# Patient Record
Sex: MALE | Race: BLACK OR AFRICAN AMERICAN | NOT HISPANIC OR LATINO
[De-identification: names, ages, dates, MRNs, and addresses within clinical notes are randomized per-mention and may not be internally consistent; named-entity substitution may affect disease eponyms.]

---

## 2021-02-03 ENCOUNTER — APPOINTMENT (OUTPATIENT)
Dept: RADIOLOGY | Facility: CLINIC | Age: 56
End: 2021-02-03

## 2021-02-04 PROBLEM — Z00.00 ENCOUNTER FOR PREVENTIVE HEALTH EXAMINATION: Status: ACTIVE | Noted: 2021-02-04

## 2021-02-10 ENCOUNTER — APPOINTMENT (OUTPATIENT)
Dept: PULMONOLOGY | Facility: CLINIC | Age: 56
End: 2021-02-10
Payer: COMMERCIAL

## 2021-02-10 VITALS — HEIGHT: 73 IN | WEIGHT: 245 LBS | BODY MASS INDEX: 32.47 KG/M2

## 2021-02-10 PROCEDURE — G0296 VISIT TO DETERM LDCT ELIG: CPT

## 2021-02-10 PROCEDURE — 99072 ADDL SUPL MATRL&STAF TM PHE: CPT

## 2021-02-10 NOTE — REASON FOR VISIT
[Home] : at home, [unfilled] , at the time of the visit. [Medical Office: ([facility])___] : at the medical office located in  [Verbal consent obtained from patient] : the patient, [unfilled] [Initial Evaluation] : an initial evaluation visit [Review of Eligibility] : review of eligibility [Low-Dose CT Screening Discussion] : low-dose CT lung cancer screening discussion [FreeTextEntry4] : wife

## 2021-02-10 NOTE — HISTORY OF PRESENT ILLNESS
[Current] : current smoker [>= 30 pack years] : >= 30 pack years [TextBox_13] : Referred by Dr. Chucky Darby\par \par Mr. ANAMARIA GUNTER  is a 56 year old man with a history of nicotine dependence\par \par He  was seen in the office by Dr. Darby  for review of eligibility for, as well as, discussion of Low-Dose CT lung cancer screening program. Over the telephone today we reviewed and confirmed that the patient meets screening eligibility criteria:\par -Age: 56 year \par Smoking status:\par -Current smoker\par -Number of pack(s) per day: up to 1\par -Number of years smoked: 35\par -Number of pack years smokin\par \par Mr. GUNTER denies any signs or symptoms of lung cancer including new cough, change in cough, hemoptysis and unintentional weight loss. \par \par Mr. GUNTER denies any personal history of lung cancer. No lung cancer in a 1st degree relative. Denies any history of lung disease. Denies any history of occupational exposures. \par \par

## 2021-02-10 NOTE — PLAN
[Smoking Cessation Guidance Provided] : Smoking cessation guidance was provided to patient [[facility]] : referred to [facility] [phone] [Smoking Cessation] : smoking cessation [FreeTextEntry1] : Plan:\par -Low Dose CT chest for lung cancer screening\par -Follow up with patient and his referring provider after his LDCT results have been reviewed by the multi-disciplinary clinical team\par -Encouraged smoking cessation\par -Referral to CTC\par \par \par Engaged in shared decision making with Mr. ANAMARIA GUNTER . Answered all questions. He verbalized understanding and agreement. He knows to call back with any questions or concerns

## 2021-02-16 ENCOUNTER — OUTPATIENT (OUTPATIENT)
Dept: OUTPATIENT SERVICES | Facility: HOSPITAL | Age: 56
LOS: 1 days | End: 2021-02-16

## 2021-02-16 ENCOUNTER — APPOINTMENT (OUTPATIENT)
Dept: CT IMAGING | Facility: CLINIC | Age: 56
End: 2021-02-16
Payer: COMMERCIAL

## 2021-02-16 PROCEDURE — 71271 CT THORAX LUNG CANCER SCR C-: CPT | Mod: 26

## 2021-02-17 ENCOUNTER — TRANSCRIPTION ENCOUNTER (OUTPATIENT)
Age: 56
End: 2021-02-17

## 2021-03-03 DIAGNOSIS — J43.2 CENTRILOBULAR EMPHYSEMA: ICD-10-CM

## 2021-03-03 DIAGNOSIS — J47.9 BRONCHIECTASIS, UNCOMPLICATED: ICD-10-CM

## 2021-03-03 DIAGNOSIS — F17.210 NICOTINE DEPENDENCE, CIGARETTES, UNCOMPLICATED: ICD-10-CM

## 2021-03-03 DIAGNOSIS — R91.8 OTHER NONSPECIFIC ABNORMAL FINDING OF LUNG FIELD: ICD-10-CM

## 2021-12-10 ENCOUNTER — NON-APPOINTMENT (OUTPATIENT)
Age: 56
End: 2021-12-10

## 2021-12-13 ENCOUNTER — APPOINTMENT (OUTPATIENT)
Dept: PHYSICAL MEDICINE AND REHAB | Facility: CLINIC | Age: 56
End: 2021-12-13
Payer: COMMERCIAL

## 2021-12-13 VITALS — WEIGHT: 245 LBS | HEIGHT: 73 IN | BODY MASS INDEX: 32.47 KG/M2

## 2021-12-13 DIAGNOSIS — M76.01 GLUTEAL TENDINITIS, RIGHT HIP: ICD-10-CM

## 2021-12-13 DIAGNOSIS — Z78.9 OTHER SPECIFIED HEALTH STATUS: ICD-10-CM

## 2021-12-13 PROCEDURE — 99202 OFFICE O/P NEW SF 15 MIN: CPT

## 2021-12-13 RX ORDER — IBUPROFEN 200 MG/1
TABLET, COATED ORAL
Refills: 0 | Status: ACTIVE | COMMUNITY

## 2021-12-13 RX ORDER — NAPROXEN SODIUM 220 MG
TABLET ORAL
Refills: 0 | Status: ACTIVE | COMMUNITY

## 2021-12-13 NOTE — ASSESSMENT
[FreeTextEntry1] : Impression:\par 1. Right GTPS\par \par Plan: The history and physical examination were reviewed. The diagnosis was discussed in detail with the patient. We discussed all the potential treatment options including physical therapy, oral medication, ultrasound guided injections, and surgery; as well as alternative therapeutics such as acupuncture and massage. We also discussed the importance of activity modification, ergonomics, and biomechanics as they pertain to the management of the condition as well as overall health and well being. I offered the patient a course of formal physical therapy as well as a HEP, however he declined stating that he could do it all on his own. We briefly reviewed the biomechanics of the exercise program. The patient will return to the office as needed. The patient expressed verbal understanding and is in agreement with the plan of care. All of the patient's questions and concerns were addressed during today's visit.

## 2021-12-13 NOTE — PHYSICAL EXAM
[FreeTextEntry1] : LUMBAR\par GEN: AAOx3. NAD.\par INSP: Spine alignment midline. No evidence of scoliosis.\par STANCE: Trendelenburg/SLS (-) R (-) L. \par GAIT: (-) Antalgic. Normal reciprocating heel to toe\par SENS: Grossly intact to LT BLE.\par REFLEXES: Symmetric patella, achilles. \par TONE: Normal. No clonus.\par SPECIAL: SLR/Slump (-) R (-) L.   Gaenslen (-) R (-) L.\par                 FADIR (-) R (-) L.          JUAN C (-) R (-) L.  \par PALP: Midline/Spinous processes (-).   Paraspinals (-) R  (-) L.   \par            QL (-) R (-) L.      SIJ (-) R (-) L.            GTB (-) R (-) L.\par \par LS ROM:                                                              \par Flex               Full.    Axial Sx (-).    LE Sx R (-) L (-).\par Ext                 Full.    Axial Sx (-).    LE Sx R (-) L (-).\par Lat Flex         Full.    Axial Sx (-).    LE Sx R (-) L (-).       \par Rotation         Full.    Axial Sx (-).    LE Sx R (-) L (-). \par Oblique Ext    Full.    Axial Sx (-).    LE Sx R (-) L (-).  \par \par HIP ROM:      RIGHT           LEFT\par Flex             Full. (-)          Full. (-)\par IR                 Full. (-)          Full. (-)\par ER                Full. (-)          Full. (-)\par \par STRENGTH:    RIGHT      LEFT\par Hip Flex            5/5 5/5\par Hip ABd            5/5 5/5\par Knee Ext           5/5 5/5\par Ankle DF           5/5 5/5\par Ankle PF           5/5 5/5\par EHL                   5/5 5/5\par EV                    5/5 5/5

## 2021-12-13 NOTE — HISTORY OF PRESENT ILLNESS
[FreeTextEntry1] : Referring Physician: Dr. Chucky Darby MD\par \par Mr. ANAMARIA GUNTER is a very pleasant 56 year male who comes in for evaluation of Right Hip Pain that has been ongoing for 1.5 weeks without any specific injury or inciting event, however he does report that his Right knee swelled up the day that the hip pain started. Since then the pain has spontaneously resolved. Patient used Advil, Ice and Heat which helped provide relief. The pain was located focal to the right lateral hip intermittent and described as achy. The pain is rated as 0/10 and ranged from 2-9/10 during the episode. The patient's symptoms were aggravated by walking and alleviated by heat, ice and Advil. The patient works as a . The patient denies any night pain, numbness/tingling, weakness, or bowel/bladder dysfunction. The patient has no other complaints at this time.\par

## 2022-04-29 ENCOUNTER — NON-APPOINTMENT (OUTPATIENT)
Age: 57
End: 2022-04-29

## 2022-05-03 ENCOUNTER — NON-APPOINTMENT (OUTPATIENT)
Age: 57
End: 2022-05-03

## 2022-05-03 ENCOUNTER — APPOINTMENT (OUTPATIENT)
Dept: PULMONOLOGY | Facility: CLINIC | Age: 57
End: 2022-05-03
Payer: COMMERCIAL

## 2022-05-03 VITALS — WEIGHT: 245 LBS | BODY MASS INDEX: 32.47 KG/M2 | HEIGHT: 73 IN

## 2022-05-03 DIAGNOSIS — F17.210 NICOTINE DEPENDENCE, CIGARETTES, UNCOMPLICATED: ICD-10-CM

## 2022-05-03 PROCEDURE — G0296 VISIT TO DETERM LDCT ELIG: CPT

## 2022-05-04 PROBLEM — F17.210 CIGARETTE SMOKER: Status: ACTIVE | Noted: 2021-02-10

## 2022-05-04 NOTE — HISTORY OF PRESENT ILLNESS
[TextBox_13] : Referred by Dr. Chucky Darby\par \par Mr. ANAMARIA GUNTER  is a 57 year old man with a history of nicotine dependence\par \par He  was seen in the office by Dr. Darby  for review of eligibility for, as well as, discussion of Low-Dose CT lung cancer screening program. Over the telephone today we reviewed and confirmed that the patient meets screening eligibility criteria:\par -Age: 57 year \par Smoking status:\par -Current smoker\par -Number of pack(s) per day: up to 1\par -Number of years smoked: 36\par -Number of pack years smokin\par \par He is currently smoking about 1/2 pack per day.\par \par Mr. GUNTER denies any signs or symptoms of lung cancer including new cough, change in cough, hemoptysis and unintentional weight loss. \par \par Mr. GUNTER denies any personal history of lung cancer. No lung cancer in a 1st degree relative. Denies any history of lung disease. Denies any history of occupational exposures. \par \par

## 2022-05-04 NOTE — PLAN
[FreeTextEntry1] : Plan:\par -Low Dose CT chest for lung cancer screening\par -Follow up with patient and his referring provider after his LDCT results have been reviewed by the multi-disciplinary clinical team\par -Encouraged smoking cessation\par -Declined referral to CTC\par \par \par Engaged in shared decision making with Mr. ANAMARIA GUNTER . Answered all questions. He verbalized understanding and agreement. He knows to call back with any questions or concerns

## 2022-05-16 ENCOUNTER — APPOINTMENT (OUTPATIENT)
Dept: CT IMAGING | Facility: CLINIC | Age: 57
End: 2022-05-16
Payer: COMMERCIAL

## 2022-05-16 ENCOUNTER — OUTPATIENT (OUTPATIENT)
Dept: OUTPATIENT SERVICES | Facility: HOSPITAL | Age: 57
LOS: 1 days | End: 2022-05-16

## 2022-05-16 ENCOUNTER — RESULT REVIEW (OUTPATIENT)
Age: 57
End: 2022-05-16

## 2022-05-16 PROCEDURE — 71271 CT THORAX LUNG CANCER SCR C-: CPT | Mod: 26

## 2022-05-18 ENCOUNTER — NON-APPOINTMENT (OUTPATIENT)
Age: 57
End: 2022-05-18

## 2022-08-09 ENCOUNTER — EMERGENCY (EMERGENCY)
Facility: HOSPITAL | Age: 57
LOS: 1 days | Discharge: HOME | End: 2022-08-09
Attending: EMERGENCY MEDICINE | Admitting: EMERGENCY MEDICINE

## 2022-08-09 VITALS — HEART RATE: 88 BPM

## 2022-08-09 VITALS
OXYGEN SATURATION: 96 % | SYSTOLIC BLOOD PRESSURE: 126 MMHG | HEART RATE: 111 BPM | DIASTOLIC BLOOD PRESSURE: 81 MMHG | RESPIRATION RATE: 18 BRPM | WEIGHT: 220.02 LBS | TEMPERATURE: 99 F

## 2022-08-09 DIAGNOSIS — B02.9 ZOSTER WITHOUT COMPLICATIONS: ICD-10-CM

## 2022-08-09 DIAGNOSIS — R00.0 TACHYCARDIA, UNSPECIFIED: ICD-10-CM

## 2022-08-09 DIAGNOSIS — R21 RASH AND OTHER NONSPECIFIC SKIN ERUPTION: ICD-10-CM

## 2022-08-09 PROCEDURE — 99283 EMERGENCY DEPT VISIT LOW MDM: CPT

## 2022-08-09 RX ORDER — VALACYCLOVIR 500 MG/1
1 TABLET, FILM COATED ORAL
Qty: 21 | Refills: 0
Start: 2022-08-09 | End: 2022-08-15

## 2022-08-09 NOTE — ED PROVIDER NOTE - NSFOLLOWUPINSTRUCTIONS_ED_ALL_ED_FT
Please take the anti-virals as prescribed and you can take over-the-counter Tylenol or Motrin as needed and as prescribed for pain.   Return to the ER if you develop any concerning symptoms.       Shingles    A rash on the skin.   Shingles is an infection. It gives you a painful skin rash and blisters that have fluid in them. Shingles is caused by the same germ (virus) that causes chickenpox.    Shingles only happens in people who:  •Have had chickenpox.      •Have been given a shot (vaccine) to protect against chickenpox. Shingles is rare in this group.        What are the causes?    This condition is caused by varicella-zoster virus. This is the same germ that causes chickenpox. After a person is exposed to the germ, the germ stays in the body but is not active (dormant).    Shingles develops if the germ becomes active again (is reactivated). This can happen many years after the first exposure to the germ. It is not known what causes this germ to become active again.      What increases the risk?    People who have had chickenpox or received the chickenpox shot are at risk for shingles. This infection is more common in people who:  •Are older than 60 years of age.    •Have a weakened disease-fighting system (immune system), such as people with:  •HIV (human immunodeficiency virus).      •AIDS (acquired immunodeficiency syndrome).      •Cancer.        •Are taking medicines that weaken the immune system, such as organ transplant medicines.      •Have a lot of stress.        What are the signs or symptoms?    The first symptoms of shingles may be itching, tingling, or pain in an area on your skin.    A rash will show on your skin a few days or weeks later. This is what usually happens:  •The rash is likely to be on one side of your body.       •The rash usually has a shape like a belt or a band. Over time, the rash turns into fluid-filled blisters.      •The blisters will break open and change into scabs.      •The scabs usually dry up in about 2–3 weeks.      You may also have:  •A fever.      •Chills.      •A headache.      •A feeling like you may vomit (nausea).        How is this treated?    The rash may last for several weeks. There is not a specific cure for this condition.    Your doctor may prescribe medicines. Medicines may:  •Help with pain.      •Help you get better sooner.      •Help to prevent long-term problems.      •Help with itching (antihistamines).      If the area involved is on your face, you may need to see a specialist. This may be an eye doctor or an ear, nose, and throat (ENT) doctor.      Follow these instructions at home:    Medicines     •Take over-the-counter and prescription medicines only as told by your doctor.      •Put on an anti-itch cream or numbing cream where you have a rash, blisters, or scabs. Do this as told by your doctor.        Helping with itching and discomfort   A bathtub filled with water.    •Put cold, wet cloths (cold compresses) on the area of the rash or blisters as told by your doctor.      •Cool baths can help you feel better. Try adding baking soda or dry oatmeal to the water to lessen itching. Do not bathe in hot water.      •Use calamine lotion as told by your doctor.      Blister and rash care     •Keep your rash covered with a loose bandage (dressing).       •Wear loose clothing that does not rub on your rash.      •Wash your hands with soap and water for at least 20 seconds before and after you change your bandage. If you cannot use soap and water, use hand .      •Change your bandage as told by your doctor.      •Keep your rash and blisters clean. To do this, wash the area with mild soap and cool water as told by your doctor.    •Check your rash every day for signs of infection. Check for:  •More redness, swelling, or pain.      •Fluid or blood.      •Warmth.      •Pus or a bad smell.      • Do not scratch your rash. Do not pick at your blisters. To help you to not scratch:  •Keep your fingernails clean and cut short.      •Wear gloves or mittens when you sleep, if scratching is a problem.        General instructions     •Rest as told by your doctor.      •Wash your hands often with soap and water for at least 20 seconds. If you cannot use soap and water, use hand . Doing this lowers your chance of getting a skin infection.    •Your infection can cause chickenpox in people who have never had chickenpox or never got a chickenpox vaccine shot. If you have blisters that did not change into scabs yet, try not to touch other people or be around other people, especially:  •Babies.      •Pregnant women.      •Children who have areas of red, itchy, or rough skin (eczema).      •Older people who have organ transplants.      •People who have a long-term (chronic) illness, like cancer or AIDS.        •Keep all follow-up visits.        How is this prevented?    A vaccine shot is the best way to prevent shingles and protect against shingles problems.    If you have not had a vaccine shot, talk with your doctor about getting it.      Where to find more information    •Centers for Disease Control and Prevention: www.cdc.gov        Contact a doctor if:    •Your pain does not get better with medicine.      •Your pain does not get better after the rash heals.    •You have any of these signs of infection around the rash:  •More redness, swelling, or pain.      •Fluid or blood.      •Warmth.      •Pus or a bad smell.        •You have a fever.        Get help right away if:    •The rash is on your face or nose.      •You have pain in your face or pain by your eye.      •You lose feeling on one side of your face.      •You have trouble seeing.      •You have ear pain, or you have ringing in your ear.      •You have a loss of taste.      •Your condition gets worse.        Summary    •Shingles gives you a painful skin rash and blisters that have fluid in them.      •Shingles is caused by the same germ (virus) that causes chickenpox.      •Keep your rash covered with a loose bandage. Wear loose clothing that does not rub on your rash.      •If you have blisters that did not change into scabs yet, try not to touch other people or be around people.      This information is not intended to replace advice given to you by your health care provider. Make sure you discuss any questions you have with your health care provider.

## 2022-08-09 NOTE — ED PROVIDER NOTE - CLINICAL SUMMARY MEDICAL DECISION MAKING FREE TEXT BOX
56 y/o M presenting today with a rash developing over his groin area and down his right thigh. ED course vital signs noted. Pt is tachycardic and afebrile. Rest of the vital sign are within normal limits. Reassured pt and advised about the rash which is secondary to shingles. Pt given Valacyclovir prescription, advised to isolate, and given return precautions. 56 yo M p/w vesicular rash to right lower back and right groin area radiating to right thigh X 3 days c/w shingles. No fevers/ chills.   ED course: Vital signs noted. Pt is tachycardic and afebrile. Rest of the vital sign are within normal limits. VS normalized without any intervention. Rash appears to be shingles in the L1 distribution. Pt reassured and Rx given for Valacyclovir prescription, advised to isolate. Advised OTC pain meds prn pain.  Non-toxic appearing and stable for discharge. To follow up outpatient. Strict return precautions given.

## 2022-08-09 NOTE — ED PROVIDER NOTE - OBJECTIVE STATEMENT
56 y/o M otherwise healthy and not on chronic daily medications works as a  presenting today with a rash developing over his groin area and down his right thigh. The pt first noticed the rash 3 days ago and says that there has been new and worsening rashes over the past 24 hours. Pt also endorses chronic lower back pain and left upper thigh/buttock pain, which pt believes is secondary to working out. Pt denies pain, fever, chills. No known monkey pox contacts. Currently present with his wife at the bed side. Pt cannot recall if he had chicken pox as a child. 56 y/o M, otherwise healthy and not on chronic daily medications, works as a , presenting today with his wife at his bedside, with a rash developing over his right groin area and down his right thigh X 3 days. The pt first noticed the rash 3 days ago and says that there has been new and worsening rashes over the past 24 hours. Pt also endorses chronic lower back pain and left upper thigh/buttock,  pain, which pt believes is secondary to working out. Pt denies pain, fever, chills. No known monkey pox contacts. Pt cannot recall if he had chicken pox as a child.

## 2022-08-09 NOTE — ED PROVIDER NOTE - PATIENT PORTAL LINK FT
You can access the FollowMyHealth Patient Portal offered by [facility] by registering at the following website: [email or web address]. By joining [facility]’s FollowMyHealth portal, you will also be able to view your health information using other applications (apps) compatible with our system.

## 2022-08-09 NOTE — ED ADULT NURSE NOTE - ASSOCIATED SYMPTOMS
Patient c/o pain to left leg and rash to right thing denies any other complaints [id number] ambulatory appears to be in no acute distress at time of assessment.

## 2022-08-09 NOTE — ED PROVIDER NOTE - SKIN, MLM
Vesicular rash noted to right sided groin area radiating down the right thigh. Diffuse vesicular rash to right lower back area. Diffuse vesicular rash noted to right sided groin area radiating down the right thigh and right lower back.

## 2022-08-09 NOTE — ED ADULT NURSE NOTE - NSIMPLEMENTINTERV_GEN_ALL_ED
Implemented All Universal Safety Interventions:  [place] to call system. Call bell, personal items and telephone within reach. Instruct patient to call for assistance. Room bathroom lighting operational. Non-slip footwear when patient is off stretcher. Physically safe environment: no spills, clutter or unnecessary equipment. Stretcher in lowest position, wheels locked, appropriate side rails in place.

## 2023-08-28 ENCOUNTER — APPOINTMENT (OUTPATIENT)
Dept: CT IMAGING | Facility: CLINIC | Age: 58
End: 2023-08-28

## 2024-07-02 ENCOUNTER — NON-APPOINTMENT (OUTPATIENT)
Age: 59
End: 2024-07-02

## 2024-07-03 VITALS — HEIGHT: 74 IN | WEIGHT: 240 LBS | BODY MASS INDEX: 30.8 KG/M2

## 2024-07-03 DIAGNOSIS — F17.210 NICOTINE DEPENDENCE, CIGARETTES, UNCOMPLICATED: ICD-10-CM

## 2024-07-14 ENCOUNTER — NON-APPOINTMENT (OUTPATIENT)
Age: 59
End: 2024-07-14

## 2024-07-15 ENCOUNTER — EMERGENCY (EMERGENCY)
Facility: HOSPITAL | Age: 59
LOS: 1 days | Discharge: HOME | End: 2024-07-15
Admitting: EMERGENCY MEDICINE
Payer: COMMERCIAL

## 2024-07-15 VITALS
WEIGHT: 240.08 LBS | HEART RATE: 110 BPM | SYSTOLIC BLOOD PRESSURE: 116 MMHG | RESPIRATION RATE: 16 BRPM | OXYGEN SATURATION: 95 % | TEMPERATURE: 98 F | HEIGHT: 73 IN | DIASTOLIC BLOOD PRESSURE: 83 MMHG

## 2024-07-15 VITALS
TEMPERATURE: 98 F | SYSTOLIC BLOOD PRESSURE: 142 MMHG | DIASTOLIC BLOOD PRESSURE: 82 MMHG | HEART RATE: 98 BPM | OXYGEN SATURATION: 95 % | RESPIRATION RATE: 16 BRPM

## 2024-07-15 DIAGNOSIS — S50.311A ABRASION OF RIGHT ELBOW, INITIAL ENCOUNTER: ICD-10-CM

## 2024-07-15 PROCEDURE — 71250 CT THORAX DX C-: CPT | Mod: 26,MC

## 2024-07-15 PROCEDURE — 73030 X-RAY EXAM OF SHOULDER: CPT | Mod: 26,RT

## 2024-07-15 PROCEDURE — 99285 EMERGENCY DEPT VISIT HI MDM: CPT

## 2024-07-15 PROCEDURE — 73564 X-RAY EXAM KNEE 4 OR MORE: CPT | Mod: 26,RT

## 2024-07-15 PROCEDURE — 73000 X-RAY EXAM OF COLLAR BONE: CPT | Mod: 26,RT

## 2024-07-15 RX ORDER — LIDOCAINE HCL 28 MG/G
1 GEL TOPICAL ONCE
Refills: 0 | Status: COMPLETED | OUTPATIENT
Start: 2024-07-15 | End: 2024-07-15

## 2024-07-15 RX ORDER — ACETAMINOPHEN 325 MG
2 TABLET ORAL
Qty: 30 | Refills: 0
Start: 2024-07-15 | End: 2024-07-19

## 2024-07-15 RX ORDER — METHOCARBAMOL 500 MG
1500 TABLET ORAL ONCE
Refills: 0 | Status: COMPLETED | OUTPATIENT
Start: 2024-07-15 | End: 2024-07-15

## 2024-07-15 RX ORDER — ACETAMINOPHEN 325 MG
975 TABLET ORAL ONCE
Refills: 0 | Status: COMPLETED | OUTPATIENT
Start: 2024-07-15 | End: 2024-07-15

## 2024-07-15 RX ORDER — LIDOCAINE HCL 28 MG/G
1 GEL TOPICAL
Qty: 1 | Refills: 0
Start: 2024-07-15 | End: 2024-07-19

## 2024-07-15 RX ORDER — OXYCODONE HYDROCHLORIDE 100 MG/5ML
1 SOLUTION ORAL
Qty: 16 | Refills: 0
Start: 2024-07-15 | End: 2024-07-18

## 2024-07-15 RX ADMIN — LIDOCAINE HCL 1 PATCH: 28 GEL TOPICAL at 14:10
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
## 2024-07-18 DIAGNOSIS — M25.511 PAIN IN RIGHT SHOULDER: ICD-10-CM

## 2024-07-18 DIAGNOSIS — R00.0 TACHYCARDIA, UNSPECIFIED: ICD-10-CM

## 2024-07-18 DIAGNOSIS — M25.561 PAIN IN RIGHT KNEE: ICD-10-CM

## 2024-07-18 DIAGNOSIS — S22.41XA MULTIPLE FRACTURES OF RIBS, RIGHT SIDE, INITIAL ENCOUNTER FOR CLOSED FRACTURE: ICD-10-CM
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
## 2024-07-18 DIAGNOSIS — S42.032A DISPLACED FRACTURE OF LATERAL END OF LEFT CLAVICLE, INITIAL ENCOUNTER FOR CLOSED FRACTURE: ICD-10-CM

## 2024-08-13 ENCOUNTER — APPOINTMENT (OUTPATIENT)
Dept: CT IMAGING | Facility: CLINIC | Age: 59
End: 2024-08-13